# Patient Record
Sex: FEMALE | Race: WHITE | NOT HISPANIC OR LATINO | URBAN - METROPOLITAN AREA
[De-identification: names, ages, dates, MRNs, and addresses within clinical notes are randomized per-mention and may not be internally consistent; named-entity substitution may affect disease eponyms.]

---

## 2020-05-07 ENCOUNTER — EMERGENCY (EMERGENCY)
Facility: HOSPITAL | Age: 60
LOS: 0 days | Discharge: LEFT BEFORE DISCHARGE | End: 2020-05-07
Attending: EMERGENCY MEDICINE | Admitting: EMERGENCY MEDICINE
Payer: COMMERCIAL

## 2020-05-07 VITALS
HEART RATE: 72 BPM | SYSTOLIC BLOOD PRESSURE: 130 MMHG | DIASTOLIC BLOOD PRESSURE: 78 MMHG | TEMPERATURE: 98 F | RESPIRATION RATE: 20 BRPM | OXYGEN SATURATION: 100 %

## 2020-05-07 DIAGNOSIS — M54.2 CERVICALGIA: ICD-10-CM

## 2020-05-07 DIAGNOSIS — S89.91XA UNSPECIFIED INJURY OF RIGHT LOWER LEG, INITIAL ENCOUNTER: ICD-10-CM
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
## 2020-05-07 DIAGNOSIS — S09.90XA UNSPECIFIED INJURY OF HEAD, INITIAL ENCOUNTER: ICD-10-CM

## 2020-05-07 DIAGNOSIS — Z23 ENCOUNTER FOR IMMUNIZATION: ICD-10-CM
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
## 2020-05-07 PROCEDURE — 73564 X-RAY EXAM KNEE 4 OR MORE: CPT | Mod: 26,RT

## 2020-05-07 PROCEDURE — 71045 X-RAY EXAM CHEST 1 VIEW: CPT | Mod: 26

## 2020-05-07 PROCEDURE — 99284 EMERGENCY DEPT VISIT MOD MDM: CPT

## 2020-05-07 PROCEDURE — 93010 ELECTROCARDIOGRAM REPORT: CPT

## 2020-05-07 RX ORDER — ACETAMINOPHEN 500 MG
975 TABLET ORAL ONCE
Refills: 0 | Status: DISCONTINUED | OUTPATIENT
Start: 2020-05-07 | End: 2020-05-07

## 2020-05-07 RX ORDER — TETANUS TOXOID, REDUCED DIPHTHERIA TOXOID AND ACELLULAR PERTUSSIS VACCINE, ADSORBED 5; 2.5; 8; 8; 2.5 [IU]/.5ML; [IU]/.5ML; UG/.5ML; UG/.5ML; UG/.5ML
0.5 SUSPENSION INTRAMUSCULAR ONCE
Refills: 0 | Status: COMPLETED | OUTPATIENT
Start: 2020-05-07 | End: 2020-05-07

## 2020-05-07 RX ADMIN — TETANUS TOXOID, REDUCED DIPHTHERIA TOXOID AND ACELLULAR PERTUSSIS VACCINE, ADSORBED 0.5 MILLILITER(S): 5; 2.5; 8; 8; 2.5 SUSPENSION INTRAMUSCULAR at 18:27

## 2020-05-07 NOTE — ED PROVIDER NOTE - CARE PLAN
Principal Discharge DX:	Fall  Secondary Diagnosis:	Closed head injury  Secondary Diagnosis:	Knee injury

## 2020-05-07 NOTE — ED PROVIDER NOTE - ATTENDING CONTRIBUTION TO CARE
59 y.o. female, no PMH, comes in after she tripped and fell 1 hr prior to arrival. Patient states she was at the store when she tripped and fell onto her R knee and onto her face. No LOC. Patient states she was on the ground when she began to feel "overwhelmed" and lightheaded. No vision changes, slurred speech, nausea, vomiting, urinary/bowel incontinence. No hx syncope. Now pt is c/o b/l neck pain and R knee pain. Has been ambulating but with pain to right knee. On exam, pt in NAD, AAOx3, head NC/AT, CN II-XII intact, face (+) abrasion to left cheekbone, neck (-) midline tenderness, (+) b/l trapezius spasm, lungs CTA B/L, CV S1S2 regular, abdomen soft/NT/ND/(+)BS, ext (-) edema, (+) TTP to right knee over the joint, FROM with pain. Will do CT/XR and reevaluate.

## 2020-05-07 NOTE — ED ADULT TRIAGE NOTE - CHIEF COMPLAINT QUOTE
patient states she tripped and fell at [employer] hit head on concrete. patient has abrasions to left cheek, bilateral hands. patient complaint of neck, back, shoulder pain. patient states she syncopized twice after fall. patient denies anticoagulant use. patient [place] [facility] [place] [facility] [employer] [place]. patient has abrasions to left cheek, bilateral hands. patient complaint of neck, back, shoulder pain. patient states she fainted twice after fall. patient denies anticoagulant use. aox4 at this time

## 2020-05-07 NOTE — ED PROVIDER NOTE - CARE PROVIDER_API CALL
your primary care doctor,   Phone: (   )    -  Fax: (   )    -  Follow Up Time: 1-3 Days    Barak Nichole)  Orthopaedic Surgery  [address] [place] [address]  Phone: [phone]  Fax: [phone]  Follow Up Time: 1-3 Days

## 2020-05-07 NOTE — ED ADULT NURSE REASSESSMENT NOTE - NS ED NURSE REASSESS COMMENT FT1
patient received from previous RN. Patient requested to leave AMA. MD made aware. Patient signed all AMA paperwork. Patient A&Ox4, ambulates with a steady gait. Patient denies any pain. Patient educated on the dangers and risk of possible death by leaving AMA. Patient verbalized understanding. Patient has no IV access. MD and charge RN made aware.

## 2020-05-07 NOTE — ED PROVIDER NOTE - PHYSICAL EXAMINATION
CONST: Well appearing in NAD  EYES: PERRL, EOMI, Sclera and conjunctiva clear.   ENT: Oropharynx normal appearing, no erythema or exudates. No abscess or swelling. Uvula midline. Small abrasion to L cheek, no step off or deformities. No periorbital or mastoid TTP or ecchymosis. No evidence of basal skull fx.   NECK: +paracervical/trapezius TTP bilaterally, no skin changes. No c-spine tenderness, normal ROM   CARD: Normal S1 S2; Normal rate and rhythm  RESP: Equal BS B/L, No wheezes, rhonchi or rales. No distress  GI: Soft, non-tender, non-distended. no cva tenderness. normal BS  MS: RLE: abrasion to R knee with +TTP overlying patella, no skin tenting. FROM of extremities. No midline spinal tenderness. pulses 2+   SKIN: Warm, dry, no acute rashes. Good turgor  NEURO: A&Ox3, No focal deficits. Strength 5/5 UE/LE bilaterally with no sensory deficits. Steady gait.

## 2020-05-07 NOTE — ED PROVIDER NOTE - NSFOLLOWUPINSTRUCTIONS_ED_ALL_ED_FT
Please follow up with your primary care doctor and orthopedist in 1-3 days    Closed Head Injury    Closed head injury in an injury to your head that may or may not involve a traumatic brain injury (TBI). Symptoms of TBI can be short or long lasting and include headache, dizziness, interference with memory or speech, fatigue, confusion, changes in sleep, mood changes, nausea, depression/anxiety, and dulling of senses. Make sure to obtain proper rest which includes getting plenty of sleep, avoiding excessive visual stimulation, and avoiding activities that may cause physical or mental stress. Avoid any situation where there is potential for another head injury including sports.    SEEK MEDICAL CARE IF YOU HAVE THE FOLLOWING SYMPTOMS: unusual drowsiness, vomiting, severe dizziness, seizures, lightheadedness, muscular weakness, different pupil sizes, visual changes, or clear or bloody discharge from your ears or nose.    Knee Pain    Knee pain is a very common symptom and can have many causes. Knee pain often goes away when you follow your health care provider's instructions for relieving pain and discomfort at home. However, knee pain can develop into a condition that needs treatment. Some conditions may include:     Arthritis caused by wear and tear (osteoarthritis).  Arthritis caused by swelling and irritation (rheumatoid arthritis or gout).  A cyst or growth in your knee.  An infection in your knee joint.  An injury that will not heal.  Damage, swelling, or irritation of the tissues that support your knee (torn ligaments or tendinitis).    If your knee pain continues, additional tests may be ordered to diagnose your condition. Tests may include X-rays or other imaging studies of your knee. You may also need to have fluid removed from your knee. Treatment for ongoing knee pain depends on the cause, but treatment may include:    Medicines to relieve pain or swelling.  Steroid injections in your knee.  Physical therapy.  Surgery.    HOME CARE INSTRUCTIONS  Take medicines only as directed by your health care provider.   Rest your knee and keep it raised (elevated) while you are resting.  Do not do things that cause or worsen pain.  Avoid high-impact activities or exercises, such as running, jumping rope, or doing jumping jacks.  Apply ice to the knee area:  Put ice in a plastic bag.  Place a towel between your skin and the bag.  Leave the ice on for 20 minutes, 2–3 times a day.  Ask your health care provider if you should wear an elastic knee support.  Keep a pillow under your knee when you sleep.  Lose weight if you are overweight. Extra weight can put pressure on your knee.  Do not use any tobacco products, including cigarettes, chewing tobacco, or electronic cigarettes. If you need help quitting, ask your health care provider. Smoking may slow the healing of any bone and joint problems that you may have.    SEEK MEDICAL CARE IF:  Your knee pain continues, changes, or gets worse.  You have a fever along with knee pain.  Your knee aria or locks up.  Your knee becomes more swollen.    SEEK IMMEDIATE MEDICAL CARE IF:  Your knee joint feels hot to the touch.  You have chest pain or trouble breathing.    Fall Prevention in the Home    Falls can cause injuries and can affect people from all age groups. There are many simple things that you can do to make your home safe and to help prevent falls.    WHAT CAN I DO ON THE OUTSIDE OF MY HOME?  Regularly repair the edges of walkways and driveways and fix any cracks.  Remove high doorway thresholds.  Trim any shrubbery on the main path into your home.  Use bright outdoor lighting.  Clear walkways of debris and clutter, including tools and rocks.  Regularly check that handrails are securely fastened and in good repair. Both sides of any steps should have handrails.  Install guardrails along the edges of any raised decks or porches.  Have leaves, snow, and ice cleared regularly.  Use sand or salt on walkways during winter months.  In the garage, clean up any spills right away, including grease or oil spills.    WHAT CAN I DO IN THE BATHROOM?  Use night lights.  Install grab bars by the toilet and in the tub and shower. Do not use towel bars as grab bars.  Use non-skid mats or decals on the floor of the tub or shower.  If you need to sit down while you are in the shower, use a plastic, non-slip stool.  Keep the floor dry. Immediately clean up any water that spills on the floor.  Remove soap buildup in the tub or shower on a regular basis.  Attach bath mats securely with double-sided non-slip rug tape.  Remove throw rugs and other tripping hazards from the floor.    WHAT CAN I DO IN THE BEDROOM?  Use night lights.  Make sure that a bedside light is easy to reach.  Do not use oversized bedding that drapes onto the floor.  Have a firm chair that has side arms to use for getting dressed.  Remove throw rugs and other tripping hazards from the floor.    WHAT CAN I DO IN THE KITCHEN?  Clean up any spills right away.  Avoid walking on wet floors.  Place frequently used items in easy-to-reach places.  If you need to reach for something above you, use a sturdy step stool that has a grab bar.  Keep electrical cables out of the way.  Do not use floor polish or wax that makes floors slippery. If you have to use wax, make sure that it is non-skid floor wax.  Remove throw rugs and other tripping hazards from the floor.    WHAT CAN I DO IN THE STAIRWAYS?  Do not leave any items on the stairs.  Make sure that there are handrails on both sides of the stairs. Fix handrails that are broken or loose. Make sure that handrails are as long as the stairways.  Check any carpeting to make sure that it is firmly attached to the stairs. Fix any carpet that is loose or worn.  Avoid having throw rugs at the top or bottom of stairways, or secure the rugs with carpet tape to prevent them from moving.  Make sure that you have a light switch at the top of the stairs and the bottom of the stairs. If you do not have them, have them installed.    WHAT ARE SOME OTHER FALL PREVENTION TIPS?  Wear closed-toe shoes that fit well and support your feet. Wear shoes that have rubber soles or low heels.  When you use a stepladder, make sure that it is completely opened and that the sides are firmly locked. Have someone hold the ladder while you are using it. Do not climb a closed stepladder.  Add color or contrast paint or tape to grab bars and handrails in your home. Place contrasting color strips on the first and last steps.  Use mobility aids as needed, such as canes, walkers, scooters, and crutches.  Turn on lights if it is dark. Replace any light bulbs that burn out.  Set up furniture so that there are clear paths. Keep the furniture in the same spot.  Fix any uneven floor surfaces.  Choose a carpet design that does not hide the edge of steps of a stairway.  Be aware of any and all pets.  Review your medicines with your healthcare provider. Some medicines can cause dizziness or changes in blood pressure, which increase your risk of falling.     Talk with your health care provider about other ways that you can decrease your risk of falls. This may include working with a physical therapist or  to improve your strength, balance, and endurance.    ADDITIONAL NOTES AND INSTRUCTIONS    Please follow up with your Primary MD in 24-48 hr.  Seek immediate medical care for any new/worsening signs or symptoms.

## 2020-05-07 NOTE — ED PROVIDER NOTE - PROGRESS NOTE DETAILS
patient requesting to leave prior to CT head, official read for XR knee. educated on risk of leaving prior to imaging, verbalizes understanding. ambulating in ED without difficulty. patient educated on importance of follow up with ortho/pmd and return to er with new/worsening s/s.

## 2020-05-07 NOTE — ED ADULT NURSE NOTE - OBJECTIVE STATEMENT
[facility] [employer] [facility]. patient has abrasions to left cheek, bilateral hands. patient complaint of neck, back, shoulder pain. patient states she fainted twice after fall. patient denies anticoagulant use. aox4 at this time

## 2020-05-07 NOTE — ED PROVIDER NOTE - OBJECTIVE STATEMENT
59 year old female, no past medical history, who presents s/p mechanical fall x1 hour PTA. Patient states she was @ store when she tripped fell onto her R knee and onto face. No LOC. Patient states she was on the ground when she began to feel "overwhelmed" and lightheaded. No vision changes, slurred speech, nausea, vomiting, urinary/bowel incontinence. No hx syncope. In ER patient c/o b/l neck pain and R knee pain. Has been ambulating w/o difficulty.

## 2020-05-07 NOTE — ED PROVIDER NOTE - CLINICAL SUMMARY MEDICAL DECISION MAKING FREE TEXT BOX
Pt decided to leave AMA. Risks explained, including missed head injury, fracture. Advised to return for worsening of symptoms.

## 2020-05-07 NOTE — ED ADULT NURSE NOTE - CHIEF COMPLAINT QUOTE
[facility] [employer] [facility]. patient has abrasions to left cheek, bilateral hands. patient complaint of neck, back, shoulder pain. patient states she fainted twice after fall. patient denies anticoagulant use. aox4 at this time

## 2020-05-07 NOTE — ED PROVIDER NOTE - PATIENT PORTAL LINK FT
You can access the FollowMyHealth Patient Portal offered by [facility] by registering at the following website: [email or web address]. By joining [employer]’s FollowMyHealth portal, you will also be able to view your health information using other applications (apps) compatible with our system.

## 2020-05-07 NOTE — ED PROVIDER NOTE - PROVIDER TOKENS
FREE:[LAST:[your primary care doctor],PHONE:[(   )    -],FAX:[(   )    -],FOLLOWUP:[1-3 Days]],PROVIDER:[TOKEN:[[id number]],FOLLOWUP:[1-3 Days]]

## 2020-05-07 NOTE — ED PROVIDER NOTE - NSFOLLOWUPCLINICS_GEN_ALL_ED_FT
[facility]  Orthpedic  [address], [place]   Phone: [phone]  Fax:   Follow Up Time: 1-3 Days    [facility]  Medicine  [address], [place]   Phone: [phone]  Fax:   Follow Up Time: 1-3 Days

## 2020-05-07 NOTE — ED PROVIDER NOTE - NS ED ROS FT
Review of Systems:  	•	CONSTITUTIONAL: no fever, no diaphoresis, no chills  	•	SKIN: no rash  	•	HEMATOLOGIC: no bleeding, no bruising  	•	EYES: no eye pain, no blurry vision  	•	ENT: no change in hearing, no sore throat, no ear pain or tinnitus  	•	RESPIRATORY: no shortness of breath, no cough  	•	CARDIAC: no chest pain, no palpitations  	•	GI: no abd pain, no nausea, no vomiting, no diarrhea  	•	GENITO-URINARY: no discharge, no dysuria; no hematuria, no increased urinary frequency  	•	MUSCULOSKELETAL: +Neck pain, +R knee pain, no joint swelling, no redness   	•	NEUROLOGIC: no weakness, no headache, no paresthesias, no LOC  	•	PSYCH: no anxiety, non suicidal, non homicidal, no hallucination, no depression